# Patient Record
Sex: MALE | Race: WHITE | NOT HISPANIC OR LATINO | URBAN - METROPOLITAN AREA
[De-identification: names, ages, dates, MRNs, and addresses within clinical notes are randomized per-mention and may not be internally consistent; named-entity substitution may affect disease eponyms.]

---

## 2023-12-06 ENCOUNTER — TELEPHONE (OUTPATIENT)
Dept: PEDIATRICS | Facility: CLINIC | Age: 23
End: 2023-12-06

## 2023-12-06 NOTE — TELEPHONE ENCOUNTER
----- Message from Thuan Muller MD sent at 11/30/2023  3:11 PM EST -----  Regarding: schedule Well Adult Visit  Let PATIENT know that:      Based on our records, PATIENT is quite  behind on their  medical care and/or on immunizations.  If PATIENT  is followed by another provider, let us know and we will update our records.    Otherwise, we want PATIENT to be scheduled for a Well Adult VIsit such as soon as possible.     If unable to reach by phone / portal, please send letter

## 2024-02-08 ENCOUNTER — TELEPHONE (OUTPATIENT)
Dept: PEDIATRICS | Facility: CLINIC | Age: 24
End: 2024-02-08

## 2024-02-08 NOTE — TELEPHONE ENCOUNTER
----- Message from Reva Garibay sent at 2/8/2024  7:43 AM EST -----  Regarding: FW: schedule Well Adult Visit      ----- Message -----  From: Thuan Muller MD  Sent: 1/23/2024   9:42 AM EST  To: Reva Garibay  Subject: schedule Well Adult Visit                        Let PATIENT know that they are due for Well Adult Care.    Please schedule such as soon as possible.     If unable to reach by phone / portal, please send letter

## 2024-05-13 ENCOUNTER — TELEPHONE (OUTPATIENT)
Dept: PEDIATRICS | Facility: CLINIC | Age: 24
End: 2024-05-13

## 2024-05-13 NOTE — TELEPHONE ENCOUNTER
----- Message from Thuan Muller MD sent at 4/29/2024  2:07 PM EDT -----  Regarding: schedule Well Adult Visit  Let PATIENT know that they are due for Well Adult Care.    Please schedule such as soon as possible.     If unable to reach by phone / portal, please send letter

## 2024-05-14 NOTE — TELEPHONE ENCOUNTER
Received a call back from [phone]; phone number belongs to another person, no relation or access to pt. I removed number from chart.

## 2024-07-09 ENCOUNTER — TELEPHONE (OUTPATIENT)
Dept: PEDIATRICS | Facility: CLINIC | Age: 24
End: 2024-07-09

## 2024-11-12 ENCOUNTER — TELEPHONE (OUTPATIENT)
Dept: PEDIATRICS | Facility: CLINIC | Age: 24
End: 2024-11-12

## 2024-11-12 NOTE — TELEPHONE ENCOUNTER
Pt called request to schedule an appt for wac and refill on ADHD meds.  Pt turned 24 yrs on 9/2/24.  Please advise

## 2024-11-14 PROBLEM — F90.2 ADHD (ATTENTION DEFICIT HYPERACTIVITY DISORDER), COMBINED TYPE: Status: ACTIVE | Noted: 2024-11-14

## 2024-11-14 PROBLEM — H54.7 VISUAL ACUITY REDUCED: Status: ACTIVE | Noted: 2024-11-14

## 2024-11-14 PROBLEM — J45.909 ASTHMA: Status: ACTIVE | Noted: 2024-11-14

## 2024-11-18 ENCOUNTER — APPOINTMENT (OUTPATIENT)
Dept: PRIMARY CARE | Facility: CLINIC | Age: 24
End: 2024-11-18
Payer: COMMERCIAL

## 2024-11-18 VITALS
OXYGEN SATURATION: 98 % | BODY MASS INDEX: 22.13 KG/M2 | SYSTOLIC BLOOD PRESSURE: 100 MMHG | HEART RATE: 53 BPM | DIASTOLIC BLOOD PRESSURE: 62 MMHG | TEMPERATURE: 97.8 F | WEIGHT: 143 LBS

## 2024-11-18 DIAGNOSIS — Z00.00 ROUTINE GENERAL MEDICAL EXAMINATION AT A HEALTH CARE FACILITY: Primary | ICD-10-CM

## 2024-11-18 PROCEDURE — 99385 PREV VISIT NEW AGE 18-39: CPT | Performed by: INTERNAL MEDICINE

## 2024-11-18 RX ORDER — DEXTROAMPHETAMINE SACCHARATE, AMPHETAMINE ASPARTATE MONOHYDRATE, DEXTROAMPHETAMINE SULFATE AND AMPHETAMINE SULFATE 5; 5; 5; 5 MG/1; MG/1; MG/1; MG/1
20 CAPSULE, EXTENDED RELEASE ORAL EVERY MORNING
Qty: 90 CAPSULE | Refills: 0 | Status: CANCELLED | OUTPATIENT
Start: 2024-11-18

## 2024-11-18 RX ORDER — DEXTROAMPHETAMINE SACCHARATE, AMPHETAMINE ASPARTATE MONOHYDRATE, DEXTROAMPHETAMINE SULFATE AND AMPHETAMINE SULFATE 5; 5; 5; 5 MG/1; MG/1; MG/1; MG/1
20 CAPSULE, EXTENDED RELEASE ORAL EVERY MORNING
COMMUNITY

## 2024-11-18 ASSESSMENT — ENCOUNTER SYMPTOMS
DIARRHEA: 0
NUMBNESS: 0
VOMITING: 0
HEMATURIA: 0
SHORTNESS OF BREATH: 0
TROUBLE SWALLOWING: 0
CONFUSION: 0
COUGH: 0
NAUSEA: 0
CHILLS: 0
TREMORS: 0
ABDOMINAL PAIN: 0
PALPITATIONS: 0
FEVER: 0
WEAKNESS: 0
EYE PAIN: 0
EYE DISCHARGE: 0
SLEEP DISTURBANCE: 0
APPETITE CHANGE: 0
DYSURIA: 0
UNEXPECTED WEIGHT CHANGE: 0
CONSTIPATION: 0
NERVOUS/ANXIOUS: 0
HEADACHES: 0
SORE THROAT: 0
BACK PAIN: 0
WHEEZING: 0
BLOOD IN STOOL: 0
DIZZINESS: 0
JOINT SWELLING: 0
WOUND: 0
FREQUENCY: 0
SEIZURES: 0
FLANK PAIN: 0

## 2024-11-18 ASSESSMENT — PATIENT HEALTH QUESTIONNAIRE - PHQ9
SUM OF ALL RESPONSES TO PHQ9 QUESTIONS 1 AND 2: 0
2. FEELING DOWN, DEPRESSED OR HOPELESS: NOT AT ALL
1. LITTLE INTEREST OR PLEASURE IN DOING THINGS: NOT AT ALL

## 2024-11-18 NOTE — PATIENT INSTRUCTIONS
[facility] [place]- call for ADHD al     Mental health clinic in [place], [place]    Address: [address] [place] [address] [address], [place] [address]  Phone: [phone]

## 2024-11-18 NOTE — PROGRESS NOTES
Subjective   Patient ID: Thuan Faulkner is a 24 y.o. male who presents for Annual Exam.    HPI   NEW PT   Here to establish.   Chart reviewed, PMHX, meds,  Social HX- updated at visit   Labs( none since 2020) and imaging reviewed      Pt requesting Adderall refill  Pt stated has not been taking ADHD meds since high school, OARRS reviewed and empty.  I told pt I will not be able to refill ADD meds at this time and referral to  placed for formal eval and to determine if and which ADD med is appropriate for him at this time.( Giving the big med gap)  Pt uses [facility]       Review of Systems   Constitutional:  Negative for appetite change, chills, fever and unexpected weight change.   HENT:  Negative for congestion, ear pain, sneezing, sore throat and trouble swallowing.    Eyes:  Negative for pain, discharge and visual disturbance.   Respiratory:  Negative for cough, shortness of breath and wheezing.    Cardiovascular:  Negative for chest pain, palpitations and leg swelling.   Gastrointestinal:  Negative for abdominal pain, blood in stool, constipation, diarrhea, nausea and vomiting.   Genitourinary:  Negative for dysuria, flank pain, frequency, hematuria and urgency.   Musculoskeletal:  Negative for back pain, gait problem and joint swelling.   Skin:  Negative for rash and wound.   Neurological:  Negative for dizziness, tremors, seizures, syncope, weakness, numbness and headaches.   Psychiatric/Behavioral:  Negative for confusion, sleep disturbance and suicidal ideas. The patient is not nervous/anxious.        Objective   /62   Pulse 53   Temp 36.6 °C (97.8 °F)   Wt 64.9 kg (143 lb)   SpO2 98%   BMI 22.13 kg/m²   Patient Health Questionnaire-2 Score: 0      Physical Exam  Vitals and nursing note reviewed.   Constitutional:       General: He is not in acute distress.     Appearance: Normal appearance.      Comments: Eyeglasses    HENT:      Head: Normocephalic and atraumatic.      Right Ear: Tympanic membrane  and ear canal normal.      Left Ear: Tympanic membrane and ear canal normal.      Nose: Nose normal.      Mouth/Throat:      Mouth: Mucous membranes are moist.      Pharynx: Oropharynx is clear.   Eyes:      Extraocular Movements: Extraocular movements intact.      Conjunctiva/sclera: Conjunctivae normal.      Pupils: Pupils are equal, round, and reactive to light.   Cardiovascular:      Rate and Rhythm: Normal rate and regular rhythm.      Heart sounds: No murmur heard.  Pulmonary:      Effort: Pulmonary effort is normal. No respiratory distress.      Breath sounds: Normal breath sounds. No wheezing or rhonchi.   Abdominal:      General: Bowel sounds are normal.      Palpations: Abdomen is soft.      Tenderness: There is no abdominal tenderness.   Musculoskeletal:         General: Normal range of motion.      Cervical back: Neck supple.      Right lower leg: No edema.      Left lower leg: No edema.   Skin:     General: Skin is warm and dry.      Findings: No bruising or rash.   Neurological:      General: No focal deficit present.      Mental Status: He is alert and oriented to person, place, and time.      Motor: No weakness.      Gait: Gait normal.   Psychiatric:         Mood and Affect: Mood normal.         Behavior: Behavior normal.       Assessment/Plan   Assessment & Plan  Routine general medical examination at a health care facility  - Counseled continued efforts on healthy lifestyle modification including balanced diet, and continued exercise for >5 minutes  - counseled age appropriate vaccines and preventative measures   - Declined Flu vax and lab work - today   - he does mostly weight lifting no cardio        Referral to [facility] for ADHD eval and treat if indicated